# Patient Record
Sex: FEMALE | Race: WHITE | NOT HISPANIC OR LATINO | Employment: OTHER | URBAN - METROPOLITAN AREA
[De-identification: names, ages, dates, MRNs, and addresses within clinical notes are randomized per-mention and may not be internally consistent; named-entity substitution may affect disease eponyms.]

---

## 2017-07-06 ENCOUNTER — HISTORICAL (OUTPATIENT)
Dept: RADIOLOGY | Facility: HOSPITAL | Age: 62
End: 2017-07-06

## 2017-07-11 ENCOUNTER — HISTORICAL (OUTPATIENT)
Dept: SURGERY | Facility: HOSPITAL | Age: 62
End: 2017-07-11

## 2017-07-11 LAB
ABS NEUT (OLG): 4.3 X10(3)/MCL (ref 1.5–6.9)
BUN SERPL-MCNC: 24 MG/DL (ref 10–20)
CALCIUM SERPL-MCNC: 9.7 MG/DL (ref 8–10.5)
CHLORIDE SERPL-SCNC: 105 MMOL/L (ref 100–108)
CO2 SERPL-SCNC: 28 MMOL/L (ref 21–35)
CREAT SERPL-MCNC: 1.04 MG/DL (ref 0.7–1.3)
ERYTHROCYTE [DISTWIDTH] IN BLOOD BY AUTOMATED COUNT: 12.5 % (ref 11.5–17)
GLUCOSE SERPL-MCNC: 101 MG/DL (ref 75–116)
GROUP & RH: NORMAL
HCT VFR BLD AUTO: 39.9 % (ref 36–48)
HGB BLD-MCNC: 13.1 GM/DL (ref 12–16)
MCH RBC QN AUTO: 32 PG (ref 27–34)
MCHC RBC AUTO-ENTMCNC: 33 GM/DL (ref 31–36)
MCV RBC AUTO: 98 FL (ref 80–99)
PLATELET # BLD AUTO: 143 X10(3)/MCL (ref 140–400)
PMV BLD AUTO: 12 FL (ref 6.8–10)
POTASSIUM SERPL-SCNC: 4.8 MMOL/L (ref 3.6–5.2)
RBC # BLD AUTO: 4.06 X10(6)/MCL (ref 4.2–5.4)
SODIUM SERPL-SCNC: 140 MMOL/L (ref 135–145)
WBC # SPEC AUTO: 7.9 X10(3)/MCL (ref 4.5–11.5)

## 2017-07-13 ENCOUNTER — HISTORICAL (OUTPATIENT)
Dept: ANESTHESIOLOGY | Facility: HOSPITAL | Age: 62
End: 2017-07-13

## 2017-08-25 ENCOUNTER — HISTORICAL (OUTPATIENT)
Dept: RADIOLOGY | Facility: HOSPITAL | Age: 62
End: 2017-08-25

## 2017-10-04 ENCOUNTER — HISTORICAL (OUTPATIENT)
Dept: ADMINISTRATIVE | Facility: HOSPITAL | Age: 62
End: 2017-10-04

## 2017-10-10 ENCOUNTER — HISTORICAL (OUTPATIENT)
Dept: ADMINISTRATIVE | Facility: HOSPITAL | Age: 62
End: 2017-10-10

## 2017-10-11 ENCOUNTER — HISTORICAL (OUTPATIENT)
Dept: ADMINISTRATIVE | Facility: HOSPITAL | Age: 62
End: 2017-10-11

## 2017-10-17 ENCOUNTER — HISTORICAL (OUTPATIENT)
Dept: ADMINISTRATIVE | Facility: HOSPITAL | Age: 62
End: 2017-10-17

## 2017-10-24 ENCOUNTER — HISTORICAL (OUTPATIENT)
Dept: ADMINISTRATIVE | Facility: HOSPITAL | Age: 62
End: 2017-10-24

## 2017-10-31 ENCOUNTER — HISTORICAL (OUTPATIENT)
Dept: ADMINISTRATIVE | Facility: HOSPITAL | Age: 62
End: 2017-10-31

## 2017-10-31 ENCOUNTER — HISTORICAL (OUTPATIENT)
Dept: LAB | Facility: HOSPITAL | Age: 62
End: 2017-10-31

## 2017-11-07 ENCOUNTER — HISTORICAL (OUTPATIENT)
Dept: ADMINISTRATIVE | Facility: HOSPITAL | Age: 62
End: 2017-11-07

## 2017-11-14 ENCOUNTER — HISTORICAL (OUTPATIENT)
Dept: ADMINISTRATIVE | Facility: HOSPITAL | Age: 62
End: 2017-11-14

## 2017-11-21 ENCOUNTER — HISTORICAL (OUTPATIENT)
Dept: ADMINISTRATIVE | Facility: HOSPITAL | Age: 62
End: 2017-11-21

## 2017-11-28 ENCOUNTER — HISTORICAL (OUTPATIENT)
Dept: ADMINISTRATIVE | Facility: HOSPITAL | Age: 62
End: 2017-11-28

## 2017-12-07 ENCOUNTER — HISTORICAL (OUTPATIENT)
Dept: ADMINISTRATIVE | Facility: HOSPITAL | Age: 62
End: 2017-12-07

## 2017-12-14 ENCOUNTER — HISTORICAL (OUTPATIENT)
Dept: ADMINISTRATIVE | Facility: HOSPITAL | Age: 62
End: 2017-12-14

## 2017-12-21 ENCOUNTER — HISTORICAL (OUTPATIENT)
Dept: ADMINISTRATIVE | Facility: HOSPITAL | Age: 62
End: 2017-12-21

## 2017-12-28 ENCOUNTER — HISTORICAL (OUTPATIENT)
Dept: ADMINISTRATIVE | Facility: HOSPITAL | Age: 62
End: 2017-12-28

## 2018-01-04 ENCOUNTER — HISTORICAL (OUTPATIENT)
Dept: ADMINISTRATIVE | Facility: HOSPITAL | Age: 63
End: 2018-01-04

## 2018-01-11 ENCOUNTER — HISTORICAL (OUTPATIENT)
Dept: ADMINISTRATIVE | Facility: HOSPITAL | Age: 63
End: 2018-01-11

## 2018-01-18 ENCOUNTER — HISTORICAL (OUTPATIENT)
Dept: ADMINISTRATIVE | Facility: HOSPITAL | Age: 63
End: 2018-01-18

## 2018-01-25 ENCOUNTER — HISTORICAL (OUTPATIENT)
Dept: ADMINISTRATIVE | Facility: HOSPITAL | Age: 63
End: 2018-01-25

## 2018-02-01 ENCOUNTER — HISTORICAL (OUTPATIENT)
Dept: ADMINISTRATIVE | Facility: HOSPITAL | Age: 63
End: 2018-02-01

## 2018-02-08 ENCOUNTER — HISTORICAL (OUTPATIENT)
Dept: ADMINISTRATIVE | Facility: HOSPITAL | Age: 63
End: 2018-02-08

## 2018-02-20 ENCOUNTER — HISTORICAL (OUTPATIENT)
Dept: RADIOLOGY | Facility: HOSPITAL | Age: 63
End: 2018-02-20

## 2018-02-22 ENCOUNTER — HISTORICAL (OUTPATIENT)
Dept: ADMINISTRATIVE | Facility: HOSPITAL | Age: 63
End: 2018-02-22

## 2018-03-08 ENCOUNTER — HISTORICAL (OUTPATIENT)
Dept: ADMINISTRATIVE | Facility: HOSPITAL | Age: 63
End: 2018-03-08

## 2018-03-22 ENCOUNTER — HISTORICAL (OUTPATIENT)
Dept: ADMINISTRATIVE | Facility: HOSPITAL | Age: 63
End: 2018-03-22

## 2018-12-31 ENCOUNTER — HISTORICAL (OUTPATIENT)
Dept: ADMINISTRATIVE | Facility: HOSPITAL | Age: 63
End: 2018-12-31

## 2019-01-14 ENCOUNTER — HISTORICAL (OUTPATIENT)
Dept: ADMINISTRATIVE | Facility: HOSPITAL | Age: 64
End: 2019-01-14

## 2019-08-08 PROBLEM — I73.9 PAD (PERIPHERAL ARTERY DISEASE): Status: ACTIVE | Noted: 2019-08-08

## 2020-02-14 ENCOUNTER — HISTORICAL (OUTPATIENT)
Dept: CARDIOLOGY | Facility: HOSPITAL | Age: 65
End: 2020-02-14

## 2021-01-04 LAB — CRC RECOMMENDATION EXT: NORMAL

## 2021-03-22 ENCOUNTER — HISTORICAL (OUTPATIENT)
Dept: INTENSIVE CARE | Facility: HOSPITAL | Age: 66
End: 2021-03-22

## 2021-04-07 ENCOUNTER — HISTORICAL (OUTPATIENT)
Dept: RADIOLOGY | Facility: HOSPITAL | Age: 66
End: 2021-04-07

## 2021-04-21 ENCOUNTER — HISTORICAL (OUTPATIENT)
Dept: INFECTIOUS DISEASES | Facility: HOSPITAL | Age: 66
End: 2021-04-21

## 2021-05-11 ENCOUNTER — HISTORICAL (OUTPATIENT)
Dept: RADIOLOGY | Facility: HOSPITAL | Age: 66
End: 2021-05-11

## 2021-10-19 ENCOUNTER — HISTORICAL (OUTPATIENT)
Dept: ADMINISTRATIVE | Facility: HOSPITAL | Age: 66
End: 2021-10-19

## 2021-10-19 LAB — POC CREATININE: 1.5 MG/DL (ref 0.6–1.3)

## 2021-10-28 ENCOUNTER — HISTORICAL (OUTPATIENT)
Dept: CARDIOLOGY | Facility: HOSPITAL | Age: 66
End: 2021-10-28

## 2021-10-28 LAB
ABS NEUT (OLG): 4.98 X10(3)/MCL (ref 2.1–9.2)
BASOPHILS # BLD AUTO: 0 X10(3)/MCL (ref 0–0.2)
BASOPHILS NFR BLD AUTO: 1 %
BUN SERPL-MCNC: 36.1 MG/DL (ref 9.8–20.1)
CALCIUM SERPL-MCNC: 9.1 MG/DL (ref 8.7–10.5)
CHLORIDE SERPL-SCNC: 110 MMOL/L (ref 98–107)
CO2 SERPL-SCNC: 22 MMOL/L (ref 23–31)
CREAT SERPL-MCNC: 1.74 MG/DL (ref 0.55–1.02)
CREAT/UREA NIT SERPL: 21
EOSINOPHIL # BLD AUTO: 0.2 X10(3)/MCL (ref 0–0.9)
EOSINOPHIL NFR BLD AUTO: 3 %
ERYTHROCYTE [DISTWIDTH] IN BLOOD BY AUTOMATED COUNT: 12.7 % (ref 11.5–17)
EST CREAT CLEARANCE SER (OHS): 26.3 ML/MIN
GLUCOSE SERPL-MCNC: 156 MG/DL (ref 82–115)
HCT VFR BLD AUTO: 34.6 % (ref 37–47)
HGB BLD-MCNC: 11.3 GM/DL (ref 12–16)
INR PPP: 1.2 (ref 0–1.3)
LYMPHOCYTES # BLD AUTO: 2.4 X10(3)/MCL (ref 0.6–4.6)
LYMPHOCYTES NFR BLD AUTO: 30 %
MCH RBC QN AUTO: 33.2 PG (ref 27–31)
MCHC RBC AUTO-ENTMCNC: 32.7 GM/DL (ref 33–36)
MCV RBC AUTO: 101.8 FL (ref 80–94)
MONOCYTES # BLD AUTO: 0.4 X10(3)/MCL (ref 0.1–1.3)
MONOCYTES NFR BLD AUTO: 6 %
NEUTROPHILS # BLD AUTO: 4.98 X10(3)/MCL (ref 2.1–9.2)
NEUTROPHILS NFR BLD AUTO: 61 %
PLATELET # BLD AUTO: 101 X10(3)/MCL (ref 130–400)
PMV BLD AUTO: 12 FL (ref 9.4–12.4)
POTASSIUM SERPL-SCNC: 5.9 MMOL/L (ref 3.5–5.1)
POTASSIUM SERPL-SCNC: 5.9 MMOL/L (ref 3.5–5.1)
PROTHROMBIN TIME: 14.4 SECOND(S) (ref 12.5–14.5)
RBC # BLD AUTO: 3.4 X10(6)/MCL (ref 4.2–5.4)
SODIUM SERPL-SCNC: 141 MMOL/L (ref 136–145)
WBC # SPEC AUTO: 8.2 X10(3)/MCL (ref 4.5–11.5)

## 2022-04-09 ENCOUNTER — HISTORICAL (OUTPATIENT)
Dept: ADMINISTRATIVE | Facility: HOSPITAL | Age: 67
End: 2022-04-09
Payer: MEDICARE

## 2022-04-29 VITALS
DIASTOLIC BLOOD PRESSURE: 54 MMHG | HEIGHT: 63 IN | WEIGHT: 158.75 LBS | SYSTOLIC BLOOD PRESSURE: 132 MMHG | BODY MASS INDEX: 28.13 KG/M2

## 2022-08-08 ENCOUNTER — OFFICE VISIT (OUTPATIENT)
Dept: HEMATOLOGY/ONCOLOGY | Facility: CLINIC | Age: 67
End: 2022-08-08
Payer: MEDICARE

## 2022-08-08 VITALS — HEIGHT: 63 IN | WEIGHT: 140 LBS | BODY MASS INDEX: 24.8 KG/M2

## 2022-08-08 DIAGNOSIS — D50.0 IRON DEFICIENCY ANEMIA DUE TO CHRONIC BLOOD LOSS: Primary | ICD-10-CM

## 2022-08-08 PROCEDURE — 99441 PR PHYSICIAN TELEPHONE EVALUATION 5-10 MIN: CPT | Mod: 95,,, | Performed by: NURSE PRACTITIONER

## 2022-08-08 PROCEDURE — 99441 PR PHYSICIAN TELEPHONE EVALUATION 5-10 MIN: ICD-10-PCS | Mod: 95,,, | Performed by: NURSE PRACTITIONER

## 2022-08-08 RX ORDER — INSULIN GLARGINE 100 [IU]/ML
INJECTION, SOLUTION SUBCUTANEOUS
COMMUNITY
Start: 2022-06-10

## 2022-08-08 RX ORDER — DOXEPIN HYDROCHLORIDE 25 MG/1
CAPSULE ORAL
COMMUNITY
Start: 2022-07-28

## 2022-08-08 RX ORDER — HYDROCODONE BITARTRATE AND ACETAMINOPHEN 7.5; 325 MG/1; MG/1
1 TABLET ORAL 3 TIMES DAILY PRN
COMMUNITY
Start: 2022-07-25

## 2022-08-08 RX ORDER — AMLODIPINE BESYLATE 2.5 MG/1
2.5 TABLET ORAL DAILY
COMMUNITY
Start: 2022-07-27

## 2022-08-08 NOTE — PROGRESS NOTES
Established Patient - Audio Only Telehealth Visit     The patient location is: her home in [place], [place]  The chief complaint leading to consultation is: anemia  Visit type: Virtual visit with audio only (telephone)  Total time spent with patient: 5 minutes.   [facility]    PATIENT: Daina Aguilar  MRN: [id number]  DATE: 8/9/2022    Diagnosis:   1. Iron deficiency anemia due to chronic blood loss      Chief Complaint: Results    Oncologic History:   Problem List: Iron deficiency anemia, stage III chronic kidney disease    Treatment History: received Feraheme 1/27/21 and 2/7/21  received Feraheme iron December 23, 2020    History of Present Illness   The patient presents with not weakness and not fatigue. The onset was 4 weeks ago. The course/duration of symptoms is worsening. The character of symptoms is generalized. The degree at present is minimal. Risk factors consist of smoking. Prior episodes: occasional. Therapy today: see nurses notes. Associated symptoms: shortness of breath, denies chest pain, denies abdominal pain, denies nausea, denies vomiting, denies fever, denies chills and denies headache.   Ms Aguilar is a 66 yo female who presented to her PCP with episodes of passing out and shaking. Seizures were in the differential. A CBC was ordered and the patient found to be profoundly anemic. She was referred to ED and subsequently admitted. Blood work in ED WBC 6.2, HGB 5.8, HCT 20.4, MCV 93, PLT 121K normal differential, Retic 3% low for this degree of anemia.   serum iron (L) 19, TIBC (H) 472, Iron saturation (L) 4 consistent with iron deficiency anemia. TSH 1.7. B12 and folate . Stool occult blood has been ordered. Patient states she had a colonoscopy a year ago. She has not noted melena. She drinks rarely. denies abdominal pain. received feraheme on 12/23/20. She was discharged 12/24/20. She represented to the ED a second time on 1/2/21 with HGB with HGB 6.3 and received 2 u  of PRBCS. She had an EGD and colonoscopy that revealed erosive gastritis. She is on Plavix but now off of the aspirin (aspirin was enteric coated).       Subjective:     Interval History:  8/8/2022:  Ms. Aguilra presents to clinic today for a 4 month follow up visit for anemia.   States she was diagnosed with Diabetes in January of this year. Has been feeling well and working on weight loss.   Reports approximately 25 pound weight loss. Making healthier choices since being diagnosed with DM.   Denies any fatigue, SOB w/ exertion, pica cravings. No bleeding.     Past Medical History:   Diagnosis Date    Acute renal failure     DUE TO DEHYDRATION    Arthritis     Carotid artery disease     Encounter for blood transfusion     Hypertension     Murmur     PAD (peripheral artery disease)     Paralytic ileus      Past Surgical History:   Procedure Laterality Date    ABDOMINOPLASTY      BACK SURGERY      BREAST SURGERY Bilateral     BIOPSIES    CHOLECYSTECTOMY      HYSTERECTOMY      LAPAROSCOPY      CHECK FALLOPIAN TUBES    NECK SURGERY      SHOULDER SURGERY Right     TONSILLECTOMY       Family History   Problem Relation Age of Onset    Heart disease Mother     Heart disease Father     Lung cancer Father     Diabetes Maternal Grandmother      Social History:  reports that she has been smoking. She has been smoking about 0.25 packs per day. She has never used smokeless tobacco. She reports previous alcohol use. She reports that she does not use drugs.    Allergies:  Review of patient's allergies indicates:   Allergen Reactions    Adhesive     Zofran odt [ondansetron] Hives    Ciprofloxacin Rash     Medications:  Current Outpatient Medications   Medication Sig Dispense Refill    amLODIPine (NORVASC) 2.5 MG tablet Take 2.5 mg by mouth once daily.      aspirin 81 MG Chew Take 81 mg by mouth once daily.      benazepril (LOTENSIN) 40 MG tablet Take 40 mg by mouth once daily.      citalopram (CELEXA) 20  "MG tablet Take 20 mg by mouth once daily.      clopidogrel (PLAVIX) 75 mg tablet Take 75 mg by mouth once daily.      doxepin (SINEQUAN) 25 MG capsule Take by mouth.      estradiol (ESTRACE) 1 MG tablet Take 1 mg by mouth once daily.      hydroCHLOROthiazide (HYDRODIURIL) 25 MG tablet Take 25 mg by mouth once daily.      HYDROcodone-acetaminophen (NORCO) 7.5-325 mg per tablet Take 1 tablet by mouth 3 (three) times daily as needed.      HYDROmorphone (DILAUDID) 4 MG tablet Take 4 mg by mouth 3 (three) times daily as needed.      hydrOXYzine HCl (ATARAX) 10 MG Tab Take 25 mg by mouth daily as needed.      L.acid-B.bifidum-B.animal-FOS 25 billion cell -100 mg Cap Take 1 capsule by mouth 3 (three) times a week.      LANTUS SOLOSTAR U-100 INSULIN glargine 100 units/mL SubQ pen SMARTSI Unit(s) SUB-Q Every Night      rosuvastatin (CRESTOR) 40 MG Tab Take 10 mg by mouth every evening.      spironolactone (ALDACTONE) 25 MG tablet Take 25 mg by mouth once daily.      tiZANidine (ZANAFLEX) 6 mg capsule Take 6 mg by mouth nightly as needed.      amLODIPine (NORVASC) 10 MG tablet Take 10 mg by mouth once daily.      cilostazol (PLETAL) 50 MG Tab Take 50 mg by mouth 2 (two) times daily.      gabapentin (NEURONTIN) 300 MG capsule Take 2 tablets by mouth 3 (three) times daily.       No current facility-administered medications for this visit.     Review of Systems: A complete 12 point review of systems performed in full with pertinent positives as described in interval history. Remainder of ROS done in full and are negative.     ECOG Performance Status: 0   Objective:      Vitals:   Vitals:    22 1026   Weight: 63.5 kg (140 lb)   Height: 5' 2.99" (1.6 m)     BMI: Body mass index is 24.81 kg/m².    Physical Exam:   None, phone visit.     Laboratory results:   2022: WBC 6.87, Hgb 12.7, MCV 95.5, plt 112,   B-12 387, creat 1.34, iron 105, ferritin 95  Lab Results   Component Value Date    WBC 8.2 " 10/28/2021    RBC 3.40 (L) 10/28/2021    HGB 11.3 (L) 10/28/2021    HCT 34.6 (L) 10/28/2021    .8 (H) 10/28/2021    MCH 33.2 (H) 10/28/2021    MCHC 32.7 (L) 10/28/2021    RDW 12.7 10/28/2021     (L) 10/28/2021    MPV 12.0 10/28/2021    MG 2.2 2017      CMP  Lab Results   Component Value Date     10/28/2021    K 5.9 (H) 10/28/2021    CO2 22 (L) 10/28/2021    BUN 36.1 (H) 10/28/2021    CREATININE 1.74 (H) 10/28/2021    CALCIUM 9.1 10/28/2021    ALBUMIN 2.7 (L) 2017    BILITOT 0.2 2017    ALKPHOS 116 2017    AST 31 2017    ALT 31 2017    EGFRNONAA 31 10/28/2021      Imagin/2021 EGD: Impression: [Erosive, friable gastritis] biopsy no malignancy    Assessment:       1. Iron deficiency anemia due to chronic blood loss    Iron deficiency anemia D50   Iron/ferritin levels lower than previous values, but still within a normal range.   Recommend continued monitoring.     2. CKD. Stable creatinine. Last visit with nephrologist was about one month ago per patient. States Dr. Grant told her renal function was improved.     Plan:   Return to clinic in 4 months for re-evaluation of laboratory studies.   She is to call sooner if she becomes symptomatic.   Continue to follow with Nephrologist every 6 months as directed.     Orders Placed This Encounter   Procedures    CBC Auto Differential    Comprehensive Metabolic Panel    Iron and TIBC    Ferritin    CBC with Differential     KRISTEN Arciniega     The reason for the audio only service rather than synchronous audio and video virtual visit was related to technical difficulties or patient preference/necessity.     Each patient to whom I provide medical services by telemedicine is:  (1) informed of the relationship between the physician and patient and the respective role of any other health care provider with respect to management of the patient; and (2) notified that they may decline to receive medical  services by telemedicine and may withdraw from such care at any time. Patient verbally consented to receive this service via voice-only telephone call.    This service was not originating from a related E/M service provided within the previous 7 days nor will  to an E/M service or procedure within the next 24 hours or my soonest available appointment.  Prevailing standard of care was able to be met in this audio-only visit.

## 2022-12-07 ENCOUNTER — OFFICE VISIT (OUTPATIENT)
Dept: HEMATOLOGY/ONCOLOGY | Facility: CLINIC | Age: 67
End: 2022-12-07
Payer: MEDICARE

## 2022-12-07 VITALS
RESPIRATION RATE: 20 BRPM | BODY MASS INDEX: 26.4 KG/M2 | DIASTOLIC BLOOD PRESSURE: 62 MMHG | SYSTOLIC BLOOD PRESSURE: 152 MMHG | HEIGHT: 63 IN | TEMPERATURE: 98 F | HEART RATE: 91 BPM | WEIGHT: 149 LBS | OXYGEN SATURATION: 99 %

## 2022-12-07 DIAGNOSIS — D50.0 IRON DEFICIENCY ANEMIA DUE TO CHRONIC BLOOD LOSS: Primary | ICD-10-CM

## 2022-12-07 PROCEDURE — 99214 OFFICE O/P EST MOD 30 MIN: CPT | Mod: ,,, | Performed by: NURSE PRACTITIONER

## 2022-12-07 PROCEDURE — 99214 PR OFFICE/OUTPT VISIT, EST, LEVL IV, 30-39 MIN: ICD-10-PCS | Mod: ,,, | Performed by: NURSE PRACTITIONER

## 2022-12-07 NOTE — PROGRESS NOTES
[facility] at [facility]    PATIENT: Daina Aguilar  MRN: [id number]  DATE: 12/7/2022    Diagnosis: Iron deficiency anemia, stage III chronic kidney disease     Chief Complaint: None    Oncologic History:   Problem List: Iron deficiency anemia, stage III chronic kidney disease     Treatment History: received Feraheme 1/27/21 and 2/7/21  received Feraheme iron December 23, 2020    History of Present Illness   The patient presents with not weakness and not fatigue. The onset was 4 weeks ago. The course/duration of symptoms is worsening. The character of symptoms is generalized. The degree at present is minimal. Risk factors consist of smoking. Prior episodes: occasional. Therapy today: see nurses notes. Associated symptoms: shortness of breath, denies chest pain, denies abdominal pain, denies nausea, denies vomiting, denies fever, denies chills and denies headache.   Ms Aguilar is a 64 yo female who presented to her PCP with episodes of passing out and shaking. Seizures were in the differential. A CBC was ordered and the patient found to be profoundly anemic. She was referred to ED and subsequently admitted. Blood work in ED WBC 6.2, HGB 5.8, HCT 20.4, MCV 93, PLT 121K normal differential, Retic 3% low for this degree of anemia.   serum iron (L) 19, TIBC (H) 472, Iron saturation (L) 4 consistent with iron deficiency anemia. TSH 1.7. B12 and folate . Stool occult blood has been ordered. Patient states she had a colonoscopy a year ago. She has not noted melena. She drinks rarely. denies abdominal pain. received feraheme on 12/23/20. She was discharged 12/24/20. She represented to the ED a second time on 1/2/21 with HGB with HGB 6.3 and received 2 u of PRBCS. She had an EGD and colonoscopy that revealed erosive gastritis. She is on Plavix but now off of the aspirin (aspirin was enteric coated).     Subjective:     Interval History:  12/7/2022:  Ms. Aguilar presents to clinic today for a 4 month follow up  visit for anemia.   States has been feeling well. Good appetite and gaining weight.    She has been able to get her HgA1c down from 12.4 to 5.6.   Denies any fatigue, SOB w/ exertion, pica cravings. No bleeding. She is not on oral iron. Feraheme - 1/27/21 and 2/7/21  She has a screening colonoscopy soon. Needs to get cardiac clearance, then colonoscopy appointment will be scheduled.   She will see her cardiologist(Dr. Prather) this month or next. She continues to take Plavix.   Under the care of Dr. Grant(nephrologist). Appointment last month, per patient report, no concerns.   She is past due on her mammogram.  saw her PCP(Ko Meneses) this a.m., and forgot to get an order.     Past Medical History:   Diagnosis Date    Acute renal failure     DUE TO DEHYDRATION    Arthritis     Carotid artery disease     Encounter for blood transfusion     Hypertension     Murmur     PAD (peripheral artery disease)     Paralytic ileus      Past Surgical History:   Procedure Laterality Date    ABDOMINOPLASTY      BACK SURGERY      BREAST SURGERY Bilateral     BIOPSIES    CHOLECYSTECTOMY      HYSTERECTOMY      LAPAROSCOPY      CHECK FALLOPIAN TUBES    NECK SURGERY      SHOULDER SURGERY Right     TONSILLECTOMY       Family History   Problem Relation Age of Onset    Heart disease Mother     Heart disease Father     Lung cancer Father     Diabetes Maternal Grandmother      Social History:  reports that she has been smoking. She has been smoking an average of .25 packs per day. She has never used smokeless tobacco. She reports that she does not currently use alcohol. She reports that she does not use drugs.    Allergies:  Review of patient's allergies indicates:   Allergen Reactions    Adhesive     Zofran odt [ondansetron] Hives    Ciprofloxacin Rash     Medications:  Current Outpatient Medications   Medication Sig Dispense Refill    amLODIPine (NORVASC) 10 MG tablet Take 10 mg by mouth once daily.      amLODIPine (NORVASC) 2.5 MG  "tablet Take 2.5 mg by mouth once daily.      aspirin 81 MG Chew Take 81 mg by mouth once daily.      benazepril (LOTENSIN) 40 MG tablet Take 40 mg by mouth once daily.      citalopram (CELEXA) 20 MG tablet Take 20 mg by mouth once daily.      clopidogrel (PLAVIX) 75 mg tablet Take 75 mg by mouth once daily.      doxepin (SINEQUAN) 25 MG capsule Take by mouth.      estradiol (ESTRACE) 1 MG tablet Take 1 mg by mouth once daily.      hydroCHLOROthiazide (HYDRODIURIL) 25 MG tablet Take 25 mg by mouth once daily.      HYDROcodone-acetaminophen (NORCO) 7.5-325 mg per tablet Take 1 tablet by mouth 3 (three) times daily as needed.      HYDROmorphone (DILAUDID) 4 MG tablet Take 4 mg by mouth 3 (three) times daily as needed.      hydrOXYzine HCl (ATARAX) 10 MG Tab Take 25 mg by mouth daily as needed.      L.acid-B.bifidum-B.animal-FOS 25 billion cell -100 mg Cap Take 1 capsule by mouth 3 (three) times a week.      LANTUS SOLOSTAR U-100 INSULIN glargine 100 units/mL SubQ pen SMARTSI Unit(s) SUB-Q Every Night      rosuvastatin (CRESTOR) 40 MG Tab Take 10 mg by mouth every evening.      spironolactone (ALDACTONE) 25 MG tablet Take 25 mg by mouth once daily.      tiZANidine (ZANAFLEX) 6 mg capsule Take 6 mg by mouth nightly as needed.       No current facility-administered medications for this visit.     Review of Systems: A complete 12 point review of systems performed in full with pertinent positives as described in interval history. Remainder of ROS done in full and are negative.     ECOG Performance Status: 0   Objective:   Vitals: Blood pressure (!) 152/62, pulse 91, temperature 97.8 °F (36.6 °C), temperature source Oral, resp. rate 20, height 5' 2.99" (1.6 m), weight 67.6 kg (149 lb), SpO2 99 %.    Physical Exam:   General: well-developed well-nourished in no acute distress  Eye: PERRL, EOMI, clear conjunctiva, sclera anicteric.   HENT: hearing appears adequate. Moist oropharynx. No petechiae.   Neck: no thyromegaly or " lymphadenopathy  Respiratory: clear to auscultation bilaterally  Cardiovascular: regular rate and rhythm with 2/6 sys murmur, no gallops or rubs  Gastrointestinal: soft, non-tender, non-distended with normal bowel sounds, without masses to palpation  Genitourinary: no CVA tenderness to palpation  Musculoskeletal: good range of motion of all extremities/spine without limitation or discomfort  Integumentary: no rashes, bruising or skin lesions present  Neurologic: cranial nerves intact, no signs of peripheral neurological deficit, motor/sensory function intact  Lymphatics: No cervical, axillary, or inguinal nodes.    Laboratory results:   2022:  WBC 5.93, HGB 12.0, .7, platelets [id number], ANC 3.3, creatinine 1.30, potassium 5.3, iron 102, sat % 26, ferritin 88  2022: WBC 6.87, Hgb 12.7, MCV 95.5, plt 112,   B-12 387, creat 1.34, iron 105, ferritin 95  Lab Results   Component Value Date    WBC 8.2 10/28/2021    RBC 3.40 (L) 10/28/2021    HGB 11.3 (L) 10/28/2021    HCT 34.6 (L) 10/28/2021    .8 (H) 10/28/2021    MCH 33.2 (H) 10/28/2021    MCHC 32.7 (L) 10/28/2021    RDW 12.7 10/28/2021     (L) 10/28/2021    MPV 12.0 10/28/2021    MG 2.2 2017      CMP  Lab Results   Component Value Date     10/28/2021    K 5.9 (H) 10/28/2021    CO2 22 (L) 10/28/2021    BUN 36.1 (H) 10/28/2021    CREATININE 1.74 (H) 10/28/2021    CALCIUM 9.1 10/28/2021    ALBUMIN 2.7 (L) 2017    BILITOT 0.2 2017    ALKPHOS 116 2017    AST 31 2017    ALT 31 2017    EGFRNONAA 31 10/28/2021      Imagin/2021 EGD: Impression: [Erosive, friable gastritis] biopsy no malignancy    Assessment:     Iron deficiency anemia D50   Iron/ferritin levels have been trending down, but they are currently stable  and within a normal range.   Patient is feeling well. Most likely will need some IV iron in the future.   Recommend continued monitoring.   She has an upcoming screening colonoscopy(either  this month or January). Awaiting clearance from cardiologist.   She has asked for a copy of her labs to be faxed to Dr. Prather. Had  staff fax them.   -- -- Thrombocytopenia dating back to 2018/ Mother with lupus. MIMA has been negative in the past. She denies any bleediing. .    2. CKD. Stable creatinine. Last visit with Dr. Grant(nephrologist) was last month. Per patient report, stable renal function.    3. Potassium mild elevation. Patient states she has been eating oranges a lot lately. She will cut back and have potassium checked with labs done prior to cardiology visit.     4. Health maintenance. She will call Ko Meneses(PCP) and get an order for mammogram. Follow up with him for results.     Plan:   Return to clinic in 4 months for re-evaluation of laboratory studies.   She is to call sooner if she becomes symptomatic.   Continue to follow with Nephrologist every 6 months as directed.   Continue to follow with cardiologist as directed.       Irish Yanez, FRANSISCOC

## 2022-12-28 ENCOUNTER — DOCUMENTATION ONLY (OUTPATIENT)
Dept: ADMINISTRATIVE | Facility: HOSPITAL | Age: 67
End: 2022-12-28
Payer: MEDICARE

## 2023-09-07 ENCOUNTER — PATIENT MESSAGE (OUTPATIENT)
Dept: RESEARCH | Facility: HOSPITAL | Age: 68
End: 2023-09-07
Payer: MEDICARE

## 2025-05-08 ENCOUNTER — LAB VISIT (OUTPATIENT)
Dept: LAB | Facility: HOSPITAL | Age: 70
End: 2025-05-08
Payer: MEDICARE

## 2025-05-08 DIAGNOSIS — E11.9 DIABETES MELLITUS WITHOUT COMPLICATION: ICD-10-CM

## 2025-05-08 DIAGNOSIS — Z00.00 ROUTINE GENERAL MEDICAL EXAMINATION AT A HEALTH CARE FACILITY: Primary | ICD-10-CM

## 2025-05-08 LAB
ALBUMIN SERPL-MCNC: 3.6 G/DL (ref 3.4–4.8)
ALBUMIN/GLOB SERPL: 1 RATIO (ref 1.1–2)
ALP SERPL-CCNC: 87 UNIT/L (ref 40–150)
ALT SERPL-CCNC: 24 UNIT/L (ref 0–55)
ANION GAP SERPL CALC-SCNC: 8 MEQ/L
AST SERPL-CCNC: 34 UNIT/L (ref 11–45)
BASOPHILS # BLD AUTO: 0.04 X10(3)/MCL
BASOPHILS NFR BLD AUTO: 0.6 %
BILIRUB SERPL-MCNC: 0.3 MG/DL
BUN SERPL-MCNC: 24.3 MG/DL (ref 9.8–20.1)
CALCIUM SERPL-MCNC: 9.3 MG/DL (ref 8.4–10.2)
CHLORIDE SERPL-SCNC: 111 MMOL/L (ref 98–107)
CHOLEST SERPL-MCNC: 106 MG/DL
CHOLEST/HDLC SERPL: 3 {RATIO} (ref 0–5)
CO2 SERPL-SCNC: 22 MMOL/L (ref 23–31)
CREAT SERPL-MCNC: 1.19 MG/DL (ref 0.55–1.02)
CREAT UR-MCNC: 113.8 MG/DL (ref 45–106)
CREAT/UREA NIT SERPL: 20
EOSINOPHIL # BLD AUTO: 0.13 X10(3)/MCL (ref 0–0.9)
EOSINOPHIL NFR BLD AUTO: 1.9 %
ERYTHROCYTE [DISTWIDTH] IN BLOOD BY AUTOMATED COUNT: 13.5 % (ref 11.5–17)
EST. AVERAGE GLUCOSE BLD GHB EST-MCNC: 159.9 MG/DL
GFR SERPLBLD CREATININE-BSD FMLA CKD-EPI: 50 ML/MIN/1.73/M2
GLOBULIN SER-MCNC: 3.5 GM/DL (ref 2.4–3.5)
GLUCOSE SERPL-MCNC: 172 MG/DL (ref 82–115)
HBA1C MFR BLD: 7.2 %
HCT VFR BLD AUTO: 37.4 % (ref 37–47)
HDLC SERPL-MCNC: 34 MG/DL (ref 35–60)
HGB BLD-MCNC: 11.7 G/DL (ref 12–16)
IMM GRANULOCYTES # BLD AUTO: 0.02 X10(3)/MCL (ref 0–0.04)
IMM GRANULOCYTES NFR BLD AUTO: 0.3 %
LDLC SERPL CALC-MCNC: 53 MG/DL (ref 50–140)
LYMPHOCYTES # BLD AUTO: 1.69 X10(3)/MCL (ref 0.6–4.6)
LYMPHOCYTES NFR BLD AUTO: 24.8 %
MCH RBC QN AUTO: 32.1 PG (ref 27–31)
MCHC RBC AUTO-ENTMCNC: 31.3 G/DL (ref 33–36)
MCV RBC AUTO: 102.7 FL (ref 80–94)
MICROALBUMIN UR-MCNC: 408.4 UG/ML
MICROALBUMIN/CREAT RATIO PNL UR: 358.9 MG/GM CR (ref 0–30)
MONOCYTES # BLD AUTO: 0.41 X10(3)/MCL (ref 0.1–1.3)
MONOCYTES NFR BLD AUTO: 6 %
NEUTROPHILS # BLD AUTO: 4.52 X10(3)/MCL (ref 2.1–9.2)
NEUTROPHILS NFR BLD AUTO: 66.4 %
NRBC BLD AUTO-RTO: 0 %
PLATELET # BLD AUTO: 90 X10(3)/MCL (ref 130–400)
PMV BLD AUTO: 12 FL (ref 7.4–10.4)
POTASSIUM SERPL-SCNC: 4.9 MMOL/L (ref 3.5–5.1)
PROT SERPL-MCNC: 7.1 GM/DL (ref 5.8–7.6)
RBC # BLD AUTO: 3.64 X10(6)/MCL (ref 4.2–5.4)
SODIUM SERPL-SCNC: 141 MMOL/L (ref 136–145)
T4 FREE SERPL-MCNC: 0.75 NG/DL (ref 0.7–1.48)
TRIGL SERPL-MCNC: 93 MG/DL (ref 37–140)
TSH SERPL-ACNC: 1.98 UIU/ML (ref 0.35–4.94)
VLDLC SERPL CALC-MCNC: 19 MG/DL
WBC # BLD AUTO: 6.81 X10(3)/MCL (ref 4.5–11.5)

## 2025-05-08 PROCEDURE — 82570 ASSAY OF URINE CREATININE: CPT

## 2025-05-08 PROCEDURE — 83036 HEMOGLOBIN GLYCOSYLATED A1C: CPT

## 2025-05-08 PROCEDURE — 84443 ASSAY THYROID STIM HORMONE: CPT

## 2025-05-08 PROCEDURE — 85025 COMPLETE CBC W/AUTO DIFF WBC: CPT

## 2025-05-08 PROCEDURE — 36415 COLL VENOUS BLD VENIPUNCTURE: CPT

## 2025-05-08 PROCEDURE — 80061 LIPID PANEL: CPT

## 2025-05-08 PROCEDURE — 80053 COMPREHEN METABOLIC PANEL: CPT

## 2025-05-08 PROCEDURE — 84439 ASSAY OF FREE THYROXINE: CPT

## 2025-05-20 DIAGNOSIS — Z00.01 ENCOUNTER FOR GENERAL ADULT MEDICAL EXAMINATION WITH ABNORMAL FINDINGS: Primary | ICD-10-CM

## 2025-05-20 DIAGNOSIS — Z78.0 ASYMPTOMATIC MENOPAUSAL STATE: ICD-10-CM

## 2025-05-20 DIAGNOSIS — M85.80 OTHER SPECIFIED DISORDERS OF BONE DENSITY AND STRUCTURE, UNSPECIFIED SITE: ICD-10-CM

## 2025-05-20 DIAGNOSIS — Z12.31 ENCOUNTER FOR SCREENING MAMMOGRAM FOR MALIGNANT NEOPLASM OF BREAST: ICD-10-CM

## 2025-05-20 DIAGNOSIS — Z00.01 ENCOUNTER FOR ROUTINE ADULT HEALTH EXAMINATION WITH ABNORMAL FINDINGS: ICD-10-CM
